# Patient Record
Sex: MALE | Race: WHITE | Employment: FULL TIME | URBAN - METROPOLITAN AREA
[De-identification: names, ages, dates, MRNs, and addresses within clinical notes are randomized per-mention and may not be internally consistent; named-entity substitution may affect disease eponyms.]

---

## 2017-12-06 ENCOUNTER — ANESTHESIA (OUTPATIENT)
Dept: SURGERY | Facility: CLINIC | Age: 52
DRG: 355 | End: 2017-12-06
Payer: COMMERCIAL

## 2017-12-06 ENCOUNTER — ANESTHESIA EVENT (OUTPATIENT)
Dept: SURGERY | Facility: CLINIC | Age: 52
DRG: 355 | End: 2017-12-06
Payer: COMMERCIAL

## 2017-12-06 ENCOUNTER — HOSPITAL ENCOUNTER (INPATIENT)
Facility: CLINIC | Age: 52
LOS: 2 days | Discharge: HOME | DRG: 355 | End: 2017-12-09
Attending: EMERGENCY MEDICINE | Admitting: SURGERY
Payer: COMMERCIAL

## 2017-12-06 ENCOUNTER — APPOINTMENT (OUTPATIENT)
Dept: CT IMAGING | Facility: CLINIC | Age: 52
DRG: 355 | End: 2017-12-06
Attending: EMERGENCY MEDICINE
Payer: COMMERCIAL

## 2017-12-06 DIAGNOSIS — K59.03 THERAPEUTIC OPIOID-INDUCED CONSTIPATION (OIC): Primary | ICD-10-CM

## 2017-12-06 DIAGNOSIS — K56.609 SBO (SMALL BOWEL OBSTRUCTION) (H): ICD-10-CM

## 2017-12-06 DIAGNOSIS — K46.0 INCARCERATED HERNIA: ICD-10-CM

## 2017-12-06 DIAGNOSIS — T40.2X5A THERAPEUTIC OPIOID-INDUCED CONSTIPATION (OIC): Primary | ICD-10-CM

## 2017-12-06 LAB
ALBUMIN SERPL-MCNC: 4 G/DL (ref 3.4–5)
ALP SERPL-CCNC: 70 U/L (ref 40–150)
ALT SERPL W P-5'-P-CCNC: 44 U/L (ref 0–70)
ANION GAP SERPL CALCULATED.3IONS-SCNC: 9 MMOL/L (ref 3–14)
AST SERPL W P-5'-P-CCNC: 32 U/L (ref 0–45)
BASOPHILS # BLD AUTO: 0 10E9/L (ref 0–0.2)
BASOPHILS NFR BLD AUTO: 0.1 %
BILIRUB SERPL-MCNC: 0.5 MG/DL (ref 0.2–1.3)
BUN SERPL-MCNC: 17 MG/DL (ref 7–30)
CALCIUM SERPL-MCNC: 9 MG/DL (ref 8.5–10.1)
CHLORIDE SERPL-SCNC: 105 MMOL/L (ref 94–109)
CO2 SERPL-SCNC: 24 MMOL/L (ref 20–32)
CREAT SERPL-MCNC: 0.81 MG/DL (ref 0.66–1.25)
DIFFERENTIAL METHOD BLD: NORMAL
EOSINOPHIL # BLD AUTO: 0 10E9/L (ref 0–0.7)
EOSINOPHIL NFR BLD AUTO: 0.3 %
ERYTHROCYTE [DISTWIDTH] IN BLOOD BY AUTOMATED COUNT: 13.2 % (ref 10–15)
GFR SERPL CREATININE-BSD FRML MDRD: >90 ML/MIN/1.7M2
GLUCOSE SERPL-MCNC: 125 MG/DL (ref 70–99)
HCT VFR BLD AUTO: 45.4 % (ref 40–53)
HGB BLD-MCNC: 16 G/DL (ref 13.3–17.7)
IMM GRANULOCYTES # BLD: 0 10E9/L (ref 0–0.4)
IMM GRANULOCYTES NFR BLD: 0.3 %
LIPASE SERPL-CCNC: 67 U/L (ref 73–393)
LYMPHOCYTES # BLD AUTO: 0.9 10E9/L (ref 0.8–5.3)
LYMPHOCYTES NFR BLD AUTO: 11.3 %
MCH RBC QN AUTO: 29.8 PG (ref 26.5–33)
MCHC RBC AUTO-ENTMCNC: 35.2 G/DL (ref 31.5–36.5)
MCV RBC AUTO: 85 FL (ref 78–100)
MONOCYTES # BLD AUTO: 0.2 10E9/L (ref 0–1.3)
MONOCYTES NFR BLD AUTO: 2.2 %
NEUTROPHILS # BLD AUTO: 6.7 10E9/L (ref 1.6–8.3)
NEUTROPHILS NFR BLD AUTO: 85.8 %
NRBC # BLD AUTO: 0 10*3/UL
NRBC BLD AUTO-RTO: 0 /100
PLATELET # BLD AUTO: 201 10E9/L (ref 150–450)
POTASSIUM SERPL-SCNC: 3.7 MMOL/L (ref 3.4–5.3)
PROT SERPL-MCNC: 7.6 G/DL (ref 6.8–8.8)
RBC # BLD AUTO: 5.37 10E12/L (ref 4.4–5.9)
SODIUM SERPL-SCNC: 138 MMOL/L (ref 133–144)
WBC # BLD AUTO: 7.8 10E9/L (ref 4–11)

## 2017-12-06 PROCEDURE — 36000056 ZZH SURGERY LEVEL 3 1ST 30 MIN: Performed by: SURGERY

## 2017-12-06 PROCEDURE — 96374 THER/PROPH/DIAG INJ IV PUSH: CPT

## 2017-12-06 PROCEDURE — 27210995 ZZH RX 272: Performed by: SURGERY

## 2017-12-06 PROCEDURE — 99285 EMERGENCY DEPT VISIT HI MDM: CPT | Mod: 25

## 2017-12-06 PROCEDURE — 25000125 ZZHC RX 250: Performed by: NURSE ANESTHETIST, CERTIFIED REGISTERED

## 2017-12-06 PROCEDURE — 37000008 ZZH ANESTHESIA TECHNICAL FEE, 1ST 30 MIN: Performed by: SURGERY

## 2017-12-06 PROCEDURE — 96376 TX/PRO/DX INJ SAME DRUG ADON: CPT

## 2017-12-06 PROCEDURE — 25000125 ZZHC RX 250: Performed by: SURGERY

## 2017-12-06 PROCEDURE — 71000012 ZZH RECOVERY PHASE 1 LEVEL 1 FIRST HR: Performed by: SURGERY

## 2017-12-06 PROCEDURE — 25000128 H RX IP 250 OP 636: Performed by: ANESTHESIOLOGY

## 2017-12-06 PROCEDURE — S0077 INJECTION, CLINDAMYCIN PHOSP: HCPCS | Performed by: SURGERY

## 2017-12-06 PROCEDURE — 80053 COMPREHEN METABOLIC PANEL: CPT | Performed by: EMERGENCY MEDICINE

## 2017-12-06 PROCEDURE — 25000128 H RX IP 250 OP 636: Performed by: EMERGENCY MEDICINE

## 2017-12-06 PROCEDURE — 74177 CT ABD & PELVIS W/CONTRAST: CPT

## 2017-12-06 PROCEDURE — 25000566 ZZH SEVOFLURANE, EA 15 MIN: Performed by: SURGERY

## 2017-12-06 PROCEDURE — 85025 COMPLETE CBC W/AUTO DIFF WBC: CPT | Performed by: EMERGENCY MEDICINE

## 2017-12-06 PROCEDURE — 37000009 ZZH ANESTHESIA TECHNICAL FEE, EACH ADDTL 15 MIN: Performed by: SURGERY

## 2017-12-06 PROCEDURE — 40000169 ZZH STATISTIC PRE-PROCEDURE ASSESSMENT I: Performed by: SURGERY

## 2017-12-06 PROCEDURE — 36000058 ZZH SURGERY LEVEL 3 EA 15 ADDTL MIN: Performed by: SURGERY

## 2017-12-06 PROCEDURE — 25000125 ZZHC RX 250: Performed by: EMERGENCY MEDICINE

## 2017-12-06 PROCEDURE — 49561 ZZHC REPAIR INITIAL INCISIONAL HERNIA; INCARCERATED OR STRANGULATED: CPT | Performed by: SURGERY

## 2017-12-06 PROCEDURE — 25000125 ZZHC RX 250: Performed by: ANESTHESIOLOGY

## 2017-12-06 PROCEDURE — 27210794 ZZH OR GENERAL SUPPLY STERILE: Performed by: SURGERY

## 2017-12-06 PROCEDURE — 83690 ASSAY OF LIPASE: CPT | Performed by: EMERGENCY MEDICINE

## 2017-12-06 PROCEDURE — 96361 HYDRATE IV INFUSION ADD-ON: CPT

## 2017-12-06 PROCEDURE — 99221 1ST HOSP IP/OBS SF/LOW 40: CPT | Mod: 57 | Performed by: SURGERY

## 2017-12-06 PROCEDURE — 49568 ZZHC REPAIR HERNIA WITH MESH: CPT | Performed by: SURGERY

## 2017-12-06 PROCEDURE — 25000128 H RX IP 250 OP 636: Performed by: NURSE ANESTHETIST, CERTIFIED REGISTERED

## 2017-12-06 RX ORDER — LIDOCAINE HYDROCHLORIDE 20 MG/ML
INJECTION, SOLUTION INFILTRATION; PERINEURAL PRN
Status: DISCONTINUED | OUTPATIENT
Start: 2017-12-06 | End: 2017-12-07

## 2017-12-06 RX ORDER — MAGNESIUM HYDROXIDE 1200 MG/15ML
LIQUID ORAL PRN
Status: DISCONTINUED | OUTPATIENT
Start: 2017-12-06 | End: 2017-12-07 | Stop reason: HOSPADM

## 2017-12-06 RX ORDER — FENTANYL CITRATE 50 UG/ML
50-100 INJECTION, SOLUTION INTRAMUSCULAR; INTRAVENOUS
Status: DISCONTINUED | OUTPATIENT
Start: 2017-12-06 | End: 2017-12-07 | Stop reason: HOSPADM

## 2017-12-06 RX ORDER — CLINDAMYCIN PHOSPHATE 900 MG/50ML
900 INJECTION, SOLUTION INTRAVENOUS
Status: DISCONTINUED | OUTPATIENT
Start: 2017-12-06 | End: 2017-12-07 | Stop reason: HOSPADM

## 2017-12-06 RX ORDER — FENTANYL CITRATE 50 UG/ML
25-50 INJECTION, SOLUTION INTRAMUSCULAR; INTRAVENOUS
Status: CANCELLED | OUTPATIENT
Start: 2017-12-06

## 2017-12-06 RX ORDER — GLYCOPYRROLATE 0.2 MG/ML
INJECTION, SOLUTION INTRAMUSCULAR; INTRAVENOUS PRN
Status: DISCONTINUED | OUTPATIENT
Start: 2017-12-06 | End: 2017-12-07

## 2017-12-06 RX ORDER — ONDANSETRON 4 MG/1
4 TABLET, ORALLY DISINTEGRATING ORAL EVERY 30 MIN PRN
Status: CANCELLED | OUTPATIENT
Start: 2017-12-06

## 2017-12-06 RX ORDER — HYDROMORPHONE HYDROCHLORIDE 1 MG/ML
.3-.5 INJECTION, SOLUTION INTRAMUSCULAR; INTRAVENOUS; SUBCUTANEOUS EVERY 5 MIN PRN
Status: CANCELLED | OUTPATIENT
Start: 2017-12-06

## 2017-12-06 RX ORDER — ONDANSETRON 2 MG/ML
INJECTION INTRAMUSCULAR; INTRAVENOUS PRN
Status: DISCONTINUED | OUTPATIENT
Start: 2017-12-06 | End: 2017-12-07

## 2017-12-06 RX ORDER — NALOXONE HYDROCHLORIDE 0.4 MG/ML
.1-.4 INJECTION, SOLUTION INTRAMUSCULAR; INTRAVENOUS; SUBCUTANEOUS
Status: CANCELLED | OUTPATIENT
Start: 2017-12-06 | End: 2017-12-07

## 2017-12-06 RX ORDER — SODIUM CHLORIDE, SODIUM LACTATE, POTASSIUM CHLORIDE, CALCIUM CHLORIDE 600; 310; 30; 20 MG/100ML; MG/100ML; MG/100ML; MG/100ML
INJECTION, SOLUTION INTRAVENOUS CONTINUOUS
Status: CANCELLED | OUTPATIENT
Start: 2017-12-06

## 2017-12-06 RX ORDER — IOPAMIDOL 755 MG/ML
113 INJECTION, SOLUTION INTRAVASCULAR ONCE
Status: COMPLETED | OUTPATIENT
Start: 2017-12-06 | End: 2017-12-06

## 2017-12-06 RX ORDER — HYDROMORPHONE HYDROCHLORIDE 1 MG/ML
0.5 INJECTION, SOLUTION INTRAMUSCULAR; INTRAVENOUS; SUBCUTANEOUS
Status: DISCONTINUED | OUTPATIENT
Start: 2017-12-06 | End: 2017-12-07

## 2017-12-06 RX ORDER — SODIUM CHLORIDE, SODIUM LACTATE, POTASSIUM CHLORIDE, CALCIUM CHLORIDE 600; 310; 30; 20 MG/100ML; MG/100ML; MG/100ML; MG/100ML
INJECTION, SOLUTION INTRAVENOUS CONTINUOUS
Status: DISCONTINUED | OUTPATIENT
Start: 2017-12-06 | End: 2017-12-07 | Stop reason: HOSPADM

## 2017-12-06 RX ORDER — NEOSTIGMINE METHYLSULFATE 1 MG/ML
VIAL (ML) INJECTION PRN
Status: DISCONTINUED | OUTPATIENT
Start: 2017-12-06 | End: 2017-12-07

## 2017-12-06 RX ORDER — LIDOCAINE 40 MG/G
CREAM TOPICAL
Status: DISCONTINUED | OUTPATIENT
Start: 2017-12-06 | End: 2017-12-07 | Stop reason: HOSPADM

## 2017-12-06 RX ORDER — SODIUM CHLORIDE, SODIUM LACTATE, POTASSIUM CHLORIDE, CALCIUM CHLORIDE 600; 310; 30; 20 MG/100ML; MG/100ML; MG/100ML; MG/100ML
INJECTION, SOLUTION INTRAVENOUS CONTINUOUS PRN
Status: DISCONTINUED | OUTPATIENT
Start: 2017-12-06 | End: 2017-12-07

## 2017-12-06 RX ORDER — ONDANSETRON 2 MG/ML
4 INJECTION INTRAMUSCULAR; INTRAVENOUS EVERY 30 MIN PRN
Status: CANCELLED | OUTPATIENT
Start: 2017-12-06

## 2017-12-06 RX ORDER — PROPOFOL 10 MG/ML
INJECTION, EMULSION INTRAVENOUS PRN
Status: DISCONTINUED | OUTPATIENT
Start: 2017-12-06 | End: 2017-12-07

## 2017-12-06 RX ORDER — CLINDAMYCIN PHOSPHATE 900 MG/50ML
900 INJECTION, SOLUTION INTRAVENOUS SEE ADMIN INSTRUCTIONS
Status: DISCONTINUED | OUTPATIENT
Start: 2017-12-06 | End: 2017-12-07 | Stop reason: HOSPADM

## 2017-12-06 RX ORDER — DEXAMETHASONE SODIUM PHOSPHATE 4 MG/ML
INJECTION, SOLUTION INTRA-ARTICULAR; INTRALESIONAL; INTRAMUSCULAR; INTRAVENOUS; SOFT TISSUE PRN
Status: DISCONTINUED | OUTPATIENT
Start: 2017-12-06 | End: 2017-12-07

## 2017-12-06 RX ORDER — FENTANYL CITRATE 50 UG/ML
INJECTION, SOLUTION INTRAMUSCULAR; INTRAVENOUS PRN
Status: DISCONTINUED | OUTPATIENT
Start: 2017-12-06 | End: 2017-12-07

## 2017-12-06 RX ADMIN — SODIUM CHLORIDE, POTASSIUM CHLORIDE, SODIUM LACTATE AND CALCIUM CHLORIDE 1000 ML: 600; 310; 30; 20 INJECTION, SOLUTION INTRAVENOUS at 19:34

## 2017-12-06 RX ADMIN — NEOSTIGMINE METHYLSULFATE 5 MG: 1 INJECTION INTRAMUSCULAR; INTRAVENOUS; SUBCUTANEOUS at 23:58

## 2017-12-06 RX ADMIN — ONDANSETRON 4 MG: 2 INJECTION INTRAMUSCULAR; INTRAVENOUS at 22:54

## 2017-12-06 RX ADMIN — FENTANYL CITRATE 50 MCG: 50 INJECTION, SOLUTION INTRAMUSCULAR; INTRAVENOUS at 23:15

## 2017-12-06 RX ADMIN — PHENYLEPHRINE HYDROCHLORIDE 100 MCG: 10 INJECTION INTRAVENOUS at 22:48

## 2017-12-06 RX ADMIN — SODIUM CHLORIDE 74 ML: 9 INJECTION, SOLUTION INTRAVENOUS at 20:31

## 2017-12-06 RX ADMIN — FENTANYL CITRATE 50 MCG: 50 INJECTION, SOLUTION INTRAMUSCULAR; INTRAVENOUS at 23:25

## 2017-12-06 RX ADMIN — ROCURONIUM BROMIDE 50 MG: 10 INJECTION INTRAVENOUS at 22:47

## 2017-12-06 RX ADMIN — PROPOFOL 200 MG: 10 INJECTION, EMULSION INTRAVENOUS at 22:40

## 2017-12-06 RX ADMIN — SODIUM CHLORIDE, POTASSIUM CHLORIDE, SODIUM LACTATE AND CALCIUM CHLORIDE: 600; 310; 30; 20 INJECTION, SOLUTION INTRAVENOUS at 22:50

## 2017-12-06 RX ADMIN — FENTANYL CITRATE 50 MCG: 50 INJECTION, SOLUTION INTRAMUSCULAR; INTRAVENOUS at 23:54

## 2017-12-06 RX ADMIN — SUCCINYLCHOLINE CHLORIDE 100 MG: 20 INJECTION, SOLUTION INTRAMUSCULAR; INTRAVENOUS at 22:40

## 2017-12-06 RX ADMIN — GLYCOPYRROLATE 0.8 MG: 0.2 INJECTION, SOLUTION INTRAMUSCULAR; INTRAVENOUS at 23:58

## 2017-12-06 RX ADMIN — FENTANYL CITRATE 100 MCG: 50 INJECTION, SOLUTION INTRAMUSCULAR; INTRAVENOUS at 22:40

## 2017-12-06 RX ADMIN — PHENYLEPHRINE HYDROCHLORIDE 100 MCG: 10 INJECTION INTRAVENOUS at 22:54

## 2017-12-06 RX ADMIN — PHENYLEPHRINE HYDROCHLORIDE 100 MCG: 10 INJECTION INTRAVENOUS at 22:57

## 2017-12-06 RX ADMIN — PHENYLEPHRINE HYDROCHLORIDE 100 MCG: 10 INJECTION INTRAVENOUS at 23:10

## 2017-12-06 RX ADMIN — LIDOCAINE HYDROCHLORIDE 0.3 ML: 10 INJECTION, SOLUTION EPIDURAL; INFILTRATION; INTRACAUDAL; PERINEURAL at 22:27

## 2017-12-06 RX ADMIN — LIDOCAINE HYDROCHLORIDE 100 MG: 20 INJECTION, SOLUTION INFILTRATION; PERINEURAL at 22:40

## 2017-12-06 RX ADMIN — IOPAMIDOL 113 ML: 755 INJECTION, SOLUTION INTRAVENOUS at 20:31

## 2017-12-06 RX ADMIN — SODIUM CHLORIDE, POTASSIUM CHLORIDE, SODIUM LACTATE AND CALCIUM CHLORIDE: 600; 310; 30; 20 INJECTION, SOLUTION INTRAVENOUS at 22:26

## 2017-12-06 RX ADMIN — HYDROMORPHONE HYDROCHLORIDE 1 MG: 1 INJECTION, SOLUTION INTRAMUSCULAR; INTRAVENOUS; SUBCUTANEOUS at 19:35

## 2017-12-06 RX ADMIN — CLINDAMYCIN PHOSPHATE 900 MG: 18 INJECTION, SOLUTION INTRAVENOUS at 22:45

## 2017-12-06 RX ADMIN — MIDAZOLAM HYDROCHLORIDE 2 MG: 1 INJECTION, SOLUTION INTRAMUSCULAR; INTRAVENOUS at 22:37

## 2017-12-06 RX ADMIN — DEXAMETHASONE SODIUM PHOSPHATE 4 MG: 4 INJECTION, SOLUTION INTRA-ARTICULAR; INTRALESIONAL; INTRAMUSCULAR; INTRAVENOUS; SOFT TISSUE at 22:54

## 2017-12-06 RX ADMIN — Medication 0.5 MG: at 20:24

## 2017-12-06 RX ADMIN — PHENYLEPHRINE HYDROCHLORIDE 150 MCG: 10 INJECTION INTRAVENOUS at 23:05

## 2017-12-06 ASSESSMENT — ENCOUNTER SYMPTOMS
VOMITING: 1
DIAPHORESIS: 1
DIARRHEA: 0
FEVER: 1
CHILLS: 1
ABDOMINAL PAIN: 1

## 2017-12-06 ASSESSMENT — LIFESTYLE VARIABLES: TOBACCO_USE: 0

## 2017-12-06 ASSESSMENT — COPD QUESTIONNAIRES: COPD: 0

## 2017-12-06 NOTE — IP AVS SNAPSHOT
[facility] [address] [facility]    [address] [place] [address]    Phone:  [phone]                                       After Visit Summary   12/6/2017    Christiano Thorne    MRN: [id number]           After Visit Summary Signature Page     I have received my discharge instructions, and my questions have been answered. I have discussed any challenges I see with this plan with the nurse or doctor.    ..........................................................................................................................................  Patient/Patient Representative Signature      ..........................................................................................................................................  Patient Representative Print Name and Relationship to Patient    ..................................................               ................................................  Date                                            Time    ..........................................................................................................................................  Reviewed by Signature/Title    ...................................................              ..............................................  Date                                                            Time

## 2017-12-06 NOTE — LETTER
[facility]  [address] [place] [address]  [phone]          December 7, 2017    RE:  Christiano Thorne                                                                                                                                                       [address] [place] [id number]            To whom it may concern:    Christiano Thorne is under my professional care. He had surgery on 12/6/2017 and is expected to be out of work for 1-2 weeks.       Sincerely,        Ruth Kong MD

## 2017-12-06 NOTE — IP AVS SNAPSHOT
MRN:[id number]                      After Visit Summary   12/6/2017    Christiano Thorne    MRN: [id number]           Thank you!     Thank you for choosing [facility] for your care. Our goal is always to provide you with excellent care. Hearing back from our patients is one way we can continue to improve our services. Please take a few minutes to complete the written survey that you may receive in the mail after you visit with us. Thank you!        Patient Information     Date Of Birth          1965        Designated Caregiver       Most Recent Value    Caregiver    Will someone help with your care after discharge? yes    Name of designated caregiver Alicia    Phone number of caregiver [phone]    Caregiver address [address] [place] [address]      About your hospital stay     You were admitted on:  December 7, 2017 You last received care in the:  [place] [facility] [address] [facility]    You were discharged on:  December 9, 2017        Reason for your hospital stay       Incarcerated ventral hernia s/p exploratory laparotomy with lysis of adhesions and repair of ventral hernia                  Who to Call     For medical emergencies, please call 911.  For non-urgent questions about your medical care, please call your primary care provider or clinic, [phone]  For questions related to your surgery, please call your surgery clinic        Attending Provider     Provider Specialty    Leyda Martínez MD Emergency Medicine    Whitney Perez MD Surgery       Primary Care Provider Office Phone # Fax #    [facility] [phone]      After Care Instructions     Activity       Your activity upon discharge: activity as tolerated. No lifting >15lbs for 6 weeks. Walking/stairs are fine. No submerging incision for 2 weeks. Ok to shower 48hrs post operatively.            Diet       Follow this diet upon discharge: Regular            Wound care and  dressings       Instructions to care for your wound at home: ice to area for comfort and keep wound clean and dry.                  Follow-up Appointments     Follow-up and recommended labs and tests        Follow up with me,  Whitney Perez, within 1-2  weeks from discharge. to evaluate after surgery.  No follow up labs or test are needed.staples will be removed at this time. Please call [phone] to schedule this appt.  May take up to 4000mg of tylenol per 24 hours concurrently with oxycodone if needed for pain.                  Further instructions from your care team       Discharge Instructions following General Abdominal Surgery  [facility]    Diet:    Diet as instructed by surgeon.  Avoid gas forming foods.  You may find your appetite to be diminished briefly after surgery.  Drink plenty of fluids, especially water.  Activities:    Activity as tolerated and instructed by surgeon.  Take frequent, short walks.  No strenuous activity or heavy lifting (greater than 10-15 lbs) until approved by surgeon.  Bathing/Incision Care    Ok to shower.  Do not submerge incision (no tub baths or swimming) until advised by surgeon.  Pat incisions dry.  If present, staples will be removed in the office.  If present, tape dressing (Steri-Strips) will fall off on their own in 7-10 days.  No lotion, powders, or perfumes near or on the incision.  What to expect:    A tingly or itchy sensation around the incision is a sign of normal healing.    A small amount of clear, pink drainage from the incision is normal.  Call your surgeon if you have these signs or symptoms:    Fever greater than 101 oF or chills.    Redness, swelling, warmth, foul-smelling drainage from incision.    Increased pain that does not improve with pain medicine.    With any questions or concerns.        Pending Results     No orders found from 12/4/2017 to 12/7/2017.            Statement of Approval     Ordered           "17 0953  I have reviewed and agree with all the recommendations and orders detailed in this document.  EFFECTIVE NOW     Approved and electronically signed by:  Everett Manzano PA-C             Admission Information     Date & Time Provider Department Dept. Phone    2017 Whitney Perez MD [place] [address] [facility] [phone]      Your Vitals Were     Blood Pressure Pulse Temperature Respirations Height Weight    139/84 (BP Location: Right arm) 91 98.4  F (36.9  C) (Oral) 16 1.753 m (5' 9\") 102.1 kg (225 lb)    Pulse Oximetry BMI (Body Mass Index)                95% 33.23 kg/m2          MyChart Information     [employer] lets you send messages to your doctor, view your test results, renew your prescriptions, schedule appointments and more. To sign up, go to [email or web address] . Click on \"Log in\" on the left side of the screen, which will take you to the Welcome page. Then click on \"Sign up Now\" on the right side of the page.     You will be asked to enter the access code listed below, as well as some personal information. Please follow the directions to create your username and password.     Your access code is: [email or web address]  Expires: 3/9/2018 10:34 AM     Your access code will  in 90 days. If you need help or a new code, please call your [facility] clinic or [phone].        Care EveryWhere ID     This is your Care EveryWhere ID. This could be used by other organizations to access your [facility] medical records  [id number]        Equal Access to Services     [facility]: Hadii hang Ramirez, waaxda luqadaha, qaybta kaaleric smith . So [facility] [phone].    ATENCIÓN: Si habla español, tiene a chavez disposición servicios gratuitos de asistencia lingüística. Llame al [phone].    We comply with applicable federal civil rights laws and [place] laws. We do not discriminate on the basis of race, color, national " origin, age, disability, sex, sexual orientation, or gender identity.               Review of your medicines      START taking        Dose / Directions    oxyCODONE IR 5 MG tablet   Commonly known as:  ROXICODONE        Dose:  5-10 mg   Take 1-2 tablets (5-10 mg) by mouth every 3 hours as needed for moderate to severe pain   Quantity:  30 tablet   Refills:  0       senna-docusate 8.6-50 MG per tablet   Commonly known as:  SENOKOT-S;PERICOLACE   Used for:  Therapeutic opioid-induced constipation (OIC)        Dose:  2 tablet   Take 2 tablets by mouth 2 times daily as needed for constipation   Quantity:  30 tablet   Refills:  0         CONTINUE these medicines which have NOT CHANGED        Dose / Directions    VITAMIN C PO        Dose:  500 mg   Take 500 mg by mouth daily   Refills:  0       ZOLOFT PO        Dose:  100 mg   Take 100 mg by mouth daily   Refills:  0            Where to get your medicines      These medications were sent to [facility] [place], [place] - [address]  [address], [place] [address]     Phone:  [phone]     senna-docusate 8.6-50 MG per tablet         Some of these will need a paper prescription and others can be bought over the counter. Ask your nurse if you have questions.     Bring a paper prescription for each of these medications     oxyCODONE IR 5 MG tablet                Protect others around you: Learn how to safely use, store and throw away your medicines at www.disposemymeds.org.             Medication List: This is a list of all your medications and when to take them. Check marks below indicate your daily home schedule. Keep this list as a reference.      Medications           Morning Afternoon Evening Bedtime As Needed    oxyCODONE IR 5 MG tablet   Commonly known as:  ROXICODONE   Take 1-2 tablets (5-10 mg) by mouth every 3 hours as needed for moderate to severe pain   Last time this was given:  10 mg on 12/9/2017 10:30 AM   Next Dose Due:  May  take again anytime after 1:45 pm today for pain                                senna-docusate 8.6-50 MG per tablet   Commonly known as:  SENOKOT-S;PERICOLACE   Take 2 tablets by mouth 2 times daily as needed for constipation   Last time this was given:  2 tablets on 12/9/2017 10:30 AM   Next Dose Due:  May take again this evening if needed.                                       VITAMIN C PO   Take 500 mg by mouth daily   Next Dose Due:  Resume tomorrow per home routine.                                    ZOLOFT PO   Take 100 mg by mouth daily   Last time this was given:  100 mg on 12/9/2017 10:29 AM   Next Dose Due:  Resume tomorrow per home routine.

## 2017-12-07 PROBLEM — K46.0 INCARCERATED HERNIA: Status: ACTIVE | Noted: 2017-12-07

## 2017-12-07 LAB
ANION GAP SERPL CALCULATED.3IONS-SCNC: 7 MMOL/L (ref 3–14)
BUN SERPL-MCNC: 13 MG/DL (ref 7–30)
CALCIUM SERPL-MCNC: 8.4 MG/DL (ref 8.5–10.1)
CHLORIDE SERPL-SCNC: 105 MMOL/L (ref 94–109)
CO2 SERPL-SCNC: 25 MMOL/L (ref 20–32)
CREAT SERPL-MCNC: 0.64 MG/DL (ref 0.66–1.25)
ERYTHROCYTE [DISTWIDTH] IN BLOOD BY AUTOMATED COUNT: 13.5 % (ref 10–15)
GFR SERPL CREATININE-BSD FRML MDRD: >90 ML/MIN/1.7M2
GLUCOSE SERPL-MCNC: 125 MG/DL (ref 70–99)
HCT VFR BLD AUTO: 42.1 % (ref 40–53)
HGB BLD-MCNC: 14.2 G/DL (ref 13.3–17.7)
MCH RBC QN AUTO: 29.2 PG (ref 26.5–33)
MCHC RBC AUTO-ENTMCNC: 33.7 G/DL (ref 31.5–36.5)
MCV RBC AUTO: 86 FL (ref 78–100)
PLATELET # BLD AUTO: 170 10E9/L (ref 150–450)
POTASSIUM SERPL-SCNC: 4.1 MMOL/L (ref 3.4–5.3)
RBC # BLD AUTO: 4.87 10E12/L (ref 4.4–5.9)
SODIUM SERPL-SCNC: 137 MMOL/L (ref 133–144)
WBC # BLD AUTO: 11.1 10E9/L (ref 4–11)

## 2017-12-07 PROCEDURE — S5010 5% DEXTROSE AND 0.45% SALINE: HCPCS | Performed by: SURGERY

## 2017-12-07 PROCEDURE — 25000128 H RX IP 250 OP 636: Performed by: ANESTHESIOLOGY

## 2017-12-07 PROCEDURE — 25000128 H RX IP 250 OP 636: Performed by: SURGERY

## 2017-12-07 PROCEDURE — 80048 BASIC METABOLIC PNL TOTAL CA: CPT | Performed by: SURGERY

## 2017-12-07 PROCEDURE — 25800025 ZZH RX 258: Performed by: SURGERY

## 2017-12-07 PROCEDURE — 12000007 ZZH R&B INTERMEDIATE

## 2017-12-07 PROCEDURE — 25000128 H RX IP 250 OP 636: Performed by: NURSE ANESTHETIST, CERTIFIED REGISTERED

## 2017-12-07 PROCEDURE — S0020 INJECTION, BUPIVICAINE HYDRO: HCPCS | Performed by: SURGERY

## 2017-12-07 PROCEDURE — 85027 COMPLETE CBC AUTOMATED: CPT | Performed by: SURGERY

## 2017-12-07 PROCEDURE — 25000125 ZZHC RX 250: Performed by: SURGERY

## 2017-12-07 PROCEDURE — 25000132 ZZH RX MED GY IP 250 OP 250 PS 637: Performed by: SURGERY

## 2017-12-07 PROCEDURE — 0WQF0ZZ REPAIR ABDOMINAL WALL, OPEN APPROACH: ICD-10-PCS | Performed by: SURGERY

## 2017-12-07 PROCEDURE — 36415 COLL VENOUS BLD VENIPUNCTURE: CPT | Performed by: SURGERY

## 2017-12-07 RX ORDER — HYDROMORPHONE HYDROCHLORIDE 1 MG/ML
.3-.5 INJECTION, SOLUTION INTRAMUSCULAR; INTRAVENOUS; SUBCUTANEOUS
Status: DISCONTINUED | OUTPATIENT
Start: 2017-12-07 | End: 2017-12-09 | Stop reason: HOSPADM

## 2017-12-07 RX ORDER — OXYCODONE HYDROCHLORIDE 5 MG/1
5-10 TABLET ORAL
Status: DISCONTINUED | OUTPATIENT
Start: 2017-12-07 | End: 2017-12-09 | Stop reason: HOSPADM

## 2017-12-07 RX ORDER — ACETAMINOPHEN 325 MG/1
650 TABLET ORAL EVERY 4 HOURS PRN
Status: DISCONTINUED | OUTPATIENT
Start: 2017-12-07 | End: 2017-12-08

## 2017-12-07 RX ORDER — ONDANSETRON 4 MG/1
4 TABLET, ORALLY DISINTEGRATING ORAL EVERY 6 HOURS PRN
Status: DISCONTINUED | OUTPATIENT
Start: 2017-12-07 | End: 2017-12-09 | Stop reason: HOSPADM

## 2017-12-07 RX ORDER — SODIUM CHLORIDE, SODIUM LACTATE, POTASSIUM CHLORIDE, CALCIUM CHLORIDE 600; 310; 30; 20 MG/100ML; MG/100ML; MG/100ML; MG/100ML
INJECTION, SOLUTION INTRAVENOUS CONTINUOUS
Status: DISCONTINUED | OUTPATIENT
Start: 2017-12-07 | End: 2017-12-09

## 2017-12-07 RX ORDER — BUPIVACAINE HYDROCHLORIDE 5 MG/ML
INJECTION, SOLUTION PERINEURAL PRN
Status: DISCONTINUED | OUTPATIENT
Start: 2017-12-07 | End: 2017-12-07 | Stop reason: HOSPADM

## 2017-12-07 RX ORDER — SERTRALINE HYDROCHLORIDE 100 MG/1
100 TABLET, FILM COATED ORAL DAILY
Status: DISCONTINUED | OUTPATIENT
Start: 2017-12-07 | End: 2017-12-09 | Stop reason: HOSPADM

## 2017-12-07 RX ORDER — ONDANSETRON 2 MG/ML
4 INJECTION INTRAMUSCULAR; INTRAVENOUS EVERY 6 HOURS PRN
Status: DISCONTINUED | OUTPATIENT
Start: 2017-12-07 | End: 2017-12-09 | Stop reason: HOSPADM

## 2017-12-07 RX ORDER — LIDOCAINE 40 MG/G
CREAM TOPICAL
Status: DISCONTINUED | OUTPATIENT
Start: 2017-12-07 | End: 2017-12-09 | Stop reason: HOSPADM

## 2017-12-07 RX ORDER — NALOXONE HYDROCHLORIDE 0.4 MG/ML
.1-.4 INJECTION, SOLUTION INTRAMUSCULAR; INTRAVENOUS; SUBCUTANEOUS
Status: DISCONTINUED | OUTPATIENT
Start: 2017-12-07 | End: 2017-12-09 | Stop reason: HOSPADM

## 2017-12-07 RX ORDER — PROCHLORPERAZINE MALEATE 10 MG
10 TABLET ORAL EVERY 6 HOURS PRN
Status: DISCONTINUED | OUTPATIENT
Start: 2017-12-07 | End: 2017-12-09 | Stop reason: HOSPADM

## 2017-12-07 RX ADMIN — PHENYLEPHRINE HYDROCHLORIDE 150 MCG: 10 INJECTION INTRAVENOUS at 00:10

## 2017-12-07 RX ADMIN — ENOXAPARIN SODIUM 40 MG: 40 INJECTION SUBCUTANEOUS at 18:36

## 2017-12-07 RX ADMIN — PROPOFOL 50 MG: 10 INJECTION, EMULSION INTRAVENOUS at 00:02

## 2017-12-07 RX ADMIN — OXYCODONE HYDROCHLORIDE 10 MG: 5 TABLET ORAL at 15:52

## 2017-12-07 RX ADMIN — SODIUM CHLORIDE, POTASSIUM CHLORIDE, SODIUM LACTATE AND CALCIUM CHLORIDE: 600; 310; 30; 20 INJECTION, SOLUTION INTRAVENOUS at 00:13

## 2017-12-07 RX ADMIN — OXYCODONE HYDROCHLORIDE 10 MG: 5 TABLET ORAL at 12:02

## 2017-12-07 RX ADMIN — OXYCODONE HYDROCHLORIDE 10 MG: 5 TABLET ORAL at 22:24

## 2017-12-07 RX ADMIN — Medication 2 LOZENGE: at 02:32

## 2017-12-07 RX ADMIN — OXYCODONE HYDROCHLORIDE 10 MG: 5 TABLET ORAL at 18:53

## 2017-12-07 RX ADMIN — HYDROMORPHONE HYDROCHLORIDE 0.5 MG: 1 INJECTION, SOLUTION INTRAMUSCULAR; INTRAVENOUS; SUBCUTANEOUS at 01:57

## 2017-12-07 RX ADMIN — PROPOFOL 50 MG: 10 INJECTION, EMULSION INTRAVENOUS at 00:10

## 2017-12-07 RX ADMIN — HYDROMORPHONE HYDROCHLORIDE 0.5 MG: 1 INJECTION, SOLUTION INTRAMUSCULAR; INTRAVENOUS; SUBCUTANEOUS at 00:05

## 2017-12-07 RX ADMIN — SODIUM CHLORIDE, POTASSIUM CHLORIDE, SODIUM LACTATE AND CALCIUM CHLORIDE 500 ML: 600; 310; 30; 20 INJECTION, SOLUTION INTRAVENOUS at 06:35

## 2017-12-07 RX ADMIN — HYDROMORPHONE HYDROCHLORIDE 0.5 MG: 1 INJECTION, SOLUTION INTRAMUSCULAR; INTRAVENOUS; SUBCUTANEOUS at 01:04

## 2017-12-07 RX ADMIN — SODIUM CHLORIDE, POTASSIUM CHLORIDE, SODIUM LACTATE AND CALCIUM CHLORIDE: 600; 310; 30; 20 INJECTION, SOLUTION INTRAVENOUS at 08:51

## 2017-12-07 RX ADMIN — DEXTROSE AND SODIUM CHLORIDE: 5; 450 INJECTION, SOLUTION INTRAVENOUS at 01:58

## 2017-12-07 RX ADMIN — SODIUM CHLORIDE, POTASSIUM CHLORIDE, SODIUM LACTATE AND CALCIUM CHLORIDE: 600; 310; 30; 20 INJECTION, SOLUTION INTRAVENOUS at 12:32

## 2017-12-07 RX ADMIN — HYDROMORPHONE HYDROCHLORIDE 0.5 MG: 1 INJECTION, SOLUTION INTRAMUSCULAR; INTRAVENOUS; SUBCUTANEOUS at 08:59

## 2017-12-07 RX ADMIN — HYDROMORPHONE HYDROCHLORIDE 0.5 MG: 1 INJECTION, SOLUTION INTRAMUSCULAR; INTRAVENOUS; SUBCUTANEOUS at 05:06

## 2017-12-07 RX ADMIN — SODIUM CHLORIDE, POTASSIUM CHLORIDE, SODIUM LACTATE AND CALCIUM CHLORIDE: 600; 310; 30; 20 INJECTION, SOLUTION INTRAVENOUS at 21:00

## 2017-12-07 RX ADMIN — SERTRALINE HYDROCHLORIDE 100 MG: 100 TABLET ORAL at 08:49

## 2017-12-07 NOTE — H&P
"[facility] General Surgery Consultation    Christiano Thorne MRN# [id number]   YOB: 1965 Age: 52 year old      Date of Admission:  12/6/2017  Date of Consult: 12/6/2017         Assessment and Plan:   Patient is a 52 year old male with an incarcerated ventral hernia with concern for bowel ischemia and bowel obstruction.     PLAN:  Emergent laparotomy with possible bowel resection, lysis of adhesions, ventral hernia repair  Admit post operatively  NPO, IVF  Perioperative abx         Requesting Physician:      Dr. Tello        Chief Complaint:     Chief Complaint   Patient presents with     Abdominal Pain     started at noon today, emesis x1, states it's sharp. Appendix removed already          History of Present Illness:   Christiano Thorne is a 52 year old male who was seen in consultation at the request of Dr. Tello who presented with abdominal pain which began earlier today. He reports persistent nausea. He had one episode of self induced emesis. His last bowel movement was yesterday. He is not passing gas. He has a history of open appendectomy. He reports this pain is similar to that. He tried ibuprofen without improvement. Movement makes the pain worse. He is tachycardic. He had a CT which reveals an incarcerated ventral hernia with fat stranding surrounding the bowel concerning for possible ischemia.            Physical Exam:   Blood pressure 122/78, pulse 105, temperature 99.6  F (37.6  C), temperature source Oral, resp. rate 16, height 1.753 m (5' 9\"), weight 102.1 kg (225 lb), SpO2 95 %.  225 lbs 0 oz  General: alert, mild distress  Psych: Alert and Oriented.  Normal affect  Neurological: grossly intact  Eyes: Sclera clear  Respiratory:  nonlabored breathing  Cardiovascular:  tachycardic  GI: ventral hernia which is not reducible and is tender to palpation. Local peritonitis   Lymphatic/Hematologic/Immune:  No femoral or cervical lymphadenopathy.  Integumentary:  No rashes         " Past Medical History:   depression         Past Surgical History:   Open appendectomy for ruptured appendicitis         Current Medications:            HYDROmorphone         Home Medications:     Prior to Admission medications    Medication Sig Last Dose Taking? Auth Provider   Sertraline HCl (ZOLOFT PO) Take 100 mg by mouth daily few days ago Yes Reported, Patient   Ascorbic Acid (VITAMIN C PO) Take 500 mg by mouth daily 12/5/2017 at am Yes Unknown, Entered By History            Allergies:     Allergies   Allergen Reactions     Amoxicillin Hives, Swelling and Rash     Occurred in 1988            Family History:   No family history on file.        Social History:   Christiano Thorne  reports that he has never smoked. He does not have any smokeless tobacco history on file. He reports that he drinks alcohol. He reports that he does not use illicit drugs.          Review of Systems:   The 10 point Review of Systems is negative other than noted in the HPI.         Labs/Imaging   All new lab and imaging data was reviewed.   I have personally reviewed the imaging studies including his CT which reveals an incarcerated ventral hernia with loops of small bowel within the hernia and bowel obstruction present.         Whitney Perez MD

## 2017-12-07 NOTE — PLAN OF CARE
Problem: Patient Care Overview  Goal: Plan of Care/Patient Progress Review  Outcome: No Change  Pt to floor at 0145. VSS on RA, BP's soft. Afebrile. A/Ox4. Capno WNL throughout shift. Pain managed with PRN dilaudid. Abdomen incision CDI. Binder in place. Ice applied. Tolerating ice chips, denies nausea. Hypo BS, denies flatus. Denies numbness/tingling. LS clear, IS postop instruction. Denies SOB. IVF. Connelly with adequate output. Needs to dangle. 500 cc LR bolus ordered this AM.

## 2017-12-07 NOTE — ANESTHESIA CARE TRANSFER NOTE
Patient: Christiano Thorne    Procedure(s):  EXPLORATORY LAPAROTOMY, VENTRAL HERNIA REPAIR, LYSIS OF ADHESIONS - Wound Class: I-Clean   - Wound Class: I-Clean   - Wound Class: II-Clean Contaminated    Diagnosis: NA  Diagnosis Additional Information: No value filed.    Anesthesia Type:   General, RSI, ETT     Note:  Airway :Face Mask  Patient transferred to:PACU  Comments: Extubation / Transfer of Care Note:    Christiano Thorne    Spontaneous respirations. Strong and purposeful movement. Suctioned. Extubated awake. O2 via FM.    In PACU. Monitors on. VSS. Report to RN.    12/7/2017    Monik Li CRNA    Handoff Report: Identifed the Patient, Identified the Reponsible Provider, Reviewed the pertinent medical history, Discussed the surgical course, Reviewed Intra-OP anesthesia mangement and issues during anesthesia, Set expectations for post-procedure period and Allowed opportunity for questions and acknowledgement of understanding      Vitals: (Last set prior to Anesthesia Care Transfer)    CRNA VITALS  12/6/2017 2356 - 12/7/2017 0029      12/7/2017             Resp Rate (set): 10                Electronically Signed By: CODY Ridley CRNA  December 7, 2017  12:29 AM

## 2017-12-07 NOTE — PROGRESS NOTES
"Surgery Progress Note    S: Doing okay overnight, some pain, but it has been managed with medications. No flatus/bowel movement.     O:   BP 98/69  Pulse 105  Temp 98.2  F (36.8  C) (Oral)  Resp 12  Ht 1.753 m (5' 9\")  Wt 102.1 kg (225 lb)  SpO2 93%  BMI 33.23 kg/m2  General: In no distress  Resp: Non labored  CV: Mildly tachycardic  Abdomen: Soft, non distended, byron incisionally tender  Extremities: Warm, well perfused    Labs pending    A/P:   53 yo M POD#1 s/p ex lap with ventral hernia repair for incarcerated hernia  Continue PRN pain control, consider PCA if pain not well managed once up and moving  Clear liquid diet, await return of bowel function before advancing  Continue MIVF until adequate PO intake  Remove Connelly this AM  Lovenox for DVT ppx  Anticipate dispo to home in 2-3 days pending return of bowel function, pain control      Ruth Kong, PGY-4  [phone]    "

## 2017-12-07 NOTE — PLAN OF CARE
Problem: Patient Care Overview  Goal: Plan of Care/Patient Progress Review  Outcome: No Change  A&Ox4, VSS. C/O abd pain gave dilaudid and oxycodone, some relief noted. Removed henderson at 0900, Pt is voiding well with last PVR at 108. Ax1. Dressing is CDI, abd binder in place. BS  Have some trinkling noted, - flatus. Continues on clear liquid diet.

## 2017-12-07 NOTE — ED PROVIDER NOTES
History     Chief Complaint:  Abdominal Pain     HPI   Christiano Thorne is a 52 year old male with a history of appendectomy who presents to the emergency department today with his wife for evaluation of abdominal pain. The patient reports that his pain started about seven hours ago in his parumbilical area. He notes that his pain is similar to his appendicitis.     He describes the nature of his pain sharp and constant. He states that he had an episode of emesis about six hours ago, but he mentioned that he forced it to help his pain. He reports that he is the most comfortable at rest and his pain worsens by movement. He notes that he took one 600 mg dose of ibuprofen to help his pain, but it was not helpful. He states that the current bulge in his abdomen is new and has not had it before. The patient reports about a subjective fever along with chills, but denies experiencing dysuria or diarrhea.    Allergies:  Amoxicillin      Medications:    The patient is currently on no regular medications.      Past Medical History:    The patient does not have any past pertinent medical history.     Past Surgical History:    Appendectomy - 2003     Family History:    History reviewed. No pertinent family history.      Social History:  Smoking status: never smoker  Alcohol use: Yes   Marital Status:    Presents with wife      Review of Systems   Constitutional: Positive for chills, diaphoresis and fever.   HENT: Congestion: subjective.    Gastrointestinal: Positive for abdominal pain and vomiting. Negative for diarrhea.   All other systems reviewed and are negative.      Physical Exam   Patient Vitals for the past 24 hrs:   BP Temp Temp src Pulse Heart Rate Resp SpO2 Height Weight   12/07/17 0050 117/73 - - - 90 15 92 % - -   12/07/17 0040 126/88 - - - 93 29 94 % - -   12/07/17 0030 97/77 - - - 105 17 99 % - -   12/07/17 0028 97/77 98.7  F (37.1  C) Temporal - - 16 - - -   12/06/17 2220 125/86 97.9  F (36.6  C) Temporal  "- 79 16 97 % - -   12/06/17 2100 122/78 - - - - - 95 % - -   12/06/17 2015 - - - - - - 93 % - -   12/06/17 2000 (!) 137/95 - - - - - 95 % - -   12/06/17 1948 - - - - - - 96 % - -   12/06/17 1936 (!) 166/106 - - - - - - - -   12/06/17 1912 (!) 140/98 99.6  F (37.6  C) Oral 105 - 16 97 % 1.753 m (5' 9\") 102.1 kg (225 lb)       Physical Exam  General/Appearance: appears stated age, well-groomed, moderately uncomfortable  Eyes: EOMI, no scleral injection, no icterus  ENT: MMM  Neck: supple, nl ROM, no stiffness  Cardiovascular: RRR, nl S1S2, no m/r/g, 2+ pulses in all 4 extremities, cap refill <2sec  Respiratory: CTAB, good air movement throughout, no wheezes/rhonchi/rales, no increased WOB, no retractions  Back: no lesions  GI: abd soft with distended palpable [id number] bulge in mass superior umbilicus with positive tenderness to palpation over mass as well as an epigastrium, non-distended, nttp,  no HSM, no rebound, no guarding, nl BS  MSK: [place], good tone, no bony abnormality  Skin: warm and well-perfused, no rash, no edema, no ecchymosis, nl turgor  Neuro: GCS 15, alert and oriented, no gross focal neuro deficits  Psych: interacts appropriately  Heme: no petechia, no purpura, no active bleeding    Emergency Department Course     Imaging:  Radiology findings were communicated with the patient who voiced understanding of the findings.    CT-scan Abdomen/Pelvis w/ contrast:  1. Incarcerated small bowel loop within a ventral hernia causing  mechanical small bowel obstruction.  2. Recommend surgical consultation.  3. Horseshoe kidney.  Preliminary result per radiology.     Laboratory:  CBC: WNL (WBC 7.8, HGB 16.0, )    CMP: Glucose 125 (H), o/w WNL (Creatinine 0.81)   Lipase: 67 (L)    Interventions:  1935 - Dilaudid 1 mg IV  2250 - LR 1L IV    Emergency Department Course:  Past medical records, nursing notes, and vitals reviewed.  1909: I performed an exam of the patient and obtained history, as documented above.  "   IV inserted and blood drawn.     The patient was sent for a CT-scan while in the emergency department, findings above.     2232: Discussed the patient with Dr. Perez, who will admit the patient to a OR bed for further monitoring, evaluation, and treatment.      Impression & Plan      Medical Decision Making:  Christiano Thorne is a 52 year old male with minimal medical problems except status post appendectomy who presents with periumbilical pain and palpable mass. CT confirms incarcerated hernia, resulting in mechanical small bowel obstruction. There is no evidence of significant decreased blood flow. He will be [facility] to [facility] and admitted to surgery.    Diagnosis:    ICD-10-CM    1. Incarcerated hernia K46.0    2. SBO (small bowel obstruction) K56.609      Scribe Disclosure:  I, Gita Abreu, am serving as a scribe at 7:09 PM on 12/6/2017 to document services personally performed by Leyda Martínez MD based on my observations and the provider's statements to me.       EMERGENCY DEPARTMENT       Leyda Martínez MD  12/09/17 7822

## 2017-12-07 NOTE — ANESTHESIA PREPROCEDURE EVALUATION
Anesthesia Evaluation     . Pt has had prior anesthetic. Type: General    No history of anesthetic complications          ROS/MED HX    ENT/Pulmonary:      (-) tobacco use, asthma, COPD and sleep apnea   Neurologic:       Cardiovascular:        (-) hypertension and CAD   METS/Exercise Tolerance:     Hematologic:         Musculoskeletal:         GI/Hepatic:        (-) GERD and liver disease   Renal/Genitourinary:      (-) renal disease   Endo:      (-) Type I DM and Type II DM   Psychiatric:         Infectious Disease:         Malignancy:         Other:                     Physical Exam  Normal systems: cardiovascular, pulmonary and dental    Airway   Mallampati: III  TM distance: >3 FB  Neck ROM: full    Dental     Cardiovascular       Pulmonary                     Anesthesia Plan      History & Physical Review  History and physical reviewed and following examination; no interval change.    ASA Status:  1 emergent.    NPO Status:  > 8 hours    Plan for General, RSI and ETT with Intravenous induction. Maintenance will be Inhalation.    PONV prophylaxis:  Ondansetron (or other 5HT-3) and Dexamethasone or Solumedrol       Postoperative Care  Postoperative pain management:  IV analgesics.      Consents  Anesthetic plan, risks, benefits and alternatives discussed with:  Patient..                          .

## 2017-12-07 NOTE — ANESTHESIA POSTPROCEDURE EVALUATION
Patient: Christiano Thorne    Procedure(s):  EXPLORATORY LAPAROTOMY, VENTRAL HERNIA REPAIR, LYSIS OF ADHESIONS - Wound Class: I-Clean   - Wound Class: I-Clean   - Wound Class: II-Clean Contaminated    Diagnosis:NA  Diagnosis Additional Information: No value filed.    Anesthesia Type:  General, RSI, ETT    Note:  Anesthesia Post Evaluation    Patient location during evaluation: PACU  Patient participation: Able to fully participate in evaluation  Level of consciousness: awake and alert  Pain management: adequate  Airway patency: patent  Cardiovascular status: acceptable  Respiratory status: acceptable  Hydration status: acceptable  PONV: none     Anesthetic complications: None          Last vitals:  Vitals:    12/07/17 0104 12/07/17 0110 12/07/17 0117   BP:  117/82 117/82   Pulse:      Resp:  17 15   Temp:      SpO2: 93% 90% 92%         Electronically Signed By: Tonio Cruz MD  December 7, 2017  1:39 AM

## 2017-12-07 NOTE — OP NOTE
General Surgery Operative Note    PREOPERATIVE DIAGNOSIS: Incarcerated ventral hernia    POSTOPERATIVE DIAGNOSIS: Incarcerated ventral hernia    PROCEDURE: Exploratory laparotomy, lysis of adhesions, primary repair of ventral hernia    SURGEON:  Whitney Perez MD    ASSISTANT:  Ruth Kong M.D. [facility] resident  The PA s assistance was medically necessary to provide adequate exposure in the operating field, maintain hemostasis, cutting suture, clamping and ligating bleeding vessels, and visualization of anatomic structures throughout the surgical procedure.     ANESTHESIA:  General.    BLOOD LOSS: 35ml    FINDINGS: Incarcerated ventral hernia with omentum and a loop of small bowel incarcerated within hernia sac.  Small bowel viable.  No bowel resection required.  Omentum resected with hernia sac.  Ventral hernia defect 2 cm and midline fascia closed primarily    INDICATIONS:   Mr. Thorne presented with abdominal pain and nausea with an episode of vomiting.  He underwent a CT scan which revealed an incarcerated ventral hernia.  Dr. Tello in the emergency department attempted bedside reduction without success.  Patient was very tender to palpation and on CT scan there was significant surrounding fat stranding.  He had a lengthy discussion with the patient and his wife at bedside regarding the risks, benefits, indications and alternatives to surgery and they elected to proceed with exploratory laparotomy, possible bowel resection, lysis of adhesions, and ventral hernia repair    DETAILS OF PROCEDURE: The patient was brought to the operating room per Anesthesia, placed in supine position, and intubated without difficulty.  Perioperative antibiotics were administered.  A Connelly catheter was placed using sterile technique.  An orogastric tube was placed.A Surgical timeout was then performed, verifying the correct surgeon, site, procedure, and patient and all in the room were in agreement.     We began by making a  vertical midline incision beginning just above the umbilicus and extending to just below overlying the palpable incarcerated hernia.  The subcutaneous tissue was carefully divided with electrocautery.  We encountered the hernia sac to the right of midline and could palpate a very small 2 cm fascial defect just above the umbilicus.  The hernia sac was very large containing omentum and small bowel.  We began by initially dissecting around the hernia sac to attempt to free it.  We then elected to enter the abdomen superior to the fascial defect and hernia sac.  Staying well above the palpable hernia we divided the fascia in the midline.  The abdomen was entered and a a moderate amount of clear fluid was evacuated.  We continued to divide the fascia down to the hernia and this released the bowel intra-abdominally.  The loop of bowel which had been incarcerated appeared erythematous and indurated but viable.  We then entered the hernia sac which was very vascular and there were multiple small blood vessels which were suture ligated.  There was a large amount of omentum which was tethered to the hernia sac and portions of the omentum were ischemic.  We divided the omentum using suture ligature excising the ischemic portions with the hernia sac itself.  The hernia sac was completely excised including a moderate amount of omentum which had been incarcerated within.  We then ran the small bowel from the ligament of Treitz distally to the terminal ileum.  In the mid to distal jejunum there we identified the area of small bowel which was erythematous and mildly thickened.  This area was approximately 6 cm in length and was the previously seen incarcerated section of bowel.  Again the bowel looked very viable.  At this time the abdomen was irrigated with several liters of warm sterile saline.  The greater omentum was placed over the bowel.  The fascia was then grasped using kocher clamps and the fat anterior to the fascia was  cleared back approximately 2 cm circumferentially.  We elected to close the fascia primarily using two looped 0 PDS sutures from the superior aspect and inferior aspect of the incision and tying in the middle.  There was no tension on this closure and the fascia was very healthy and the wound was again irrigated.  The skin was closed with staples.  Sterile dressings were placed.  All needles, sponge, and instrument counts were correct at the conclusion of the procedure ×2.  The patient tolerated the procedure well and was transferred to the PACU in stable condition.  Whitney Perez MD

## 2017-12-07 NOTE — BRIEF OP NOTE
[facility] Brief Operative Note    Pre-operative diagnosis: Incarcerated hernia   Post-operative diagnosis Same   Procedure: Procedure(s):  EXPLORATORY LAPAROTOMY, VENTRAL HERNIA REPAIR - Wound Class: I-Clean   - Wound Class: I-Clean   Surgeon(s): Surgeon(s) and Role:     * Whitney Perez MD - Primary     * Ruth Kong MD - Resident - Assisting   Estimated blood loss: 25 mL    Specimens: * No specimens in log *   Findings: Bowel hyperemic, but viable, no concerns.  Primary closure.

## 2017-12-08 LAB — GLUCOSE BLDC GLUCOMTR-MCNC: 98 MG/DL (ref 70–99)

## 2017-12-08 PROCEDURE — 25000132 ZZH RX MED GY IP 250 OP 250 PS 637: Performed by: SURGERY

## 2017-12-08 PROCEDURE — 25000128 H RX IP 250 OP 636: Performed by: SURGERY

## 2017-12-08 PROCEDURE — 00000146 ZZHCL STATISTIC GLUCOSE BY METER IP

## 2017-12-08 PROCEDURE — 12000007 ZZH R&B INTERMEDIATE

## 2017-12-08 RX ORDER — ACETAMINOPHEN 325 MG/1
650 TABLET ORAL EVERY 6 HOURS
Status: DISCONTINUED | OUTPATIENT
Start: 2017-12-08 | End: 2017-12-09 | Stop reason: HOSPADM

## 2017-12-08 RX ORDER — OXYCODONE HYDROCHLORIDE 5 MG/1
5-10 TABLET ORAL
Qty: 30 TABLET | Refills: 0 | Status: SHIPPED | OUTPATIENT
Start: 2017-12-08 | End: 2017-12-22

## 2017-12-08 RX ADMIN — OXYCODONE HYDROCHLORIDE 10 MG: 5 TABLET ORAL at 17:57

## 2017-12-08 RX ADMIN — SODIUM CHLORIDE, POTASSIUM CHLORIDE, SODIUM LACTATE AND CALCIUM CHLORIDE: 600; 310; 30; 20 INJECTION, SOLUTION INTRAVENOUS at 12:33

## 2017-12-08 RX ADMIN — SODIUM CHLORIDE, POTASSIUM CHLORIDE, SODIUM LACTATE AND CALCIUM CHLORIDE: 600; 310; 30; 20 INJECTION, SOLUTION INTRAVENOUS at 04:36

## 2017-12-08 RX ADMIN — OXYCODONE HYDROCHLORIDE 10 MG: 5 TABLET ORAL at 01:43

## 2017-12-08 RX ADMIN — ENOXAPARIN SODIUM 40 MG: 40 INJECTION SUBCUTANEOUS at 17:47

## 2017-12-08 RX ADMIN — OXYCODONE HYDROCHLORIDE 10 MG: 5 TABLET ORAL at 04:54

## 2017-12-08 RX ADMIN — OXYCODONE HYDROCHLORIDE 10 MG: 5 TABLET ORAL at 08:31

## 2017-12-08 RX ADMIN — OXYCODONE HYDROCHLORIDE 10 MG: 5 TABLET ORAL at 11:44

## 2017-12-08 RX ADMIN — SERTRALINE HYDROCHLORIDE 100 MG: 100 TABLET ORAL at 08:27

## 2017-12-08 RX ADMIN — ACETAMINOPHEN 650 MG: 325 TABLET, FILM COATED ORAL at 17:47

## 2017-12-08 RX ADMIN — ACETAMINOPHEN 650 MG: 325 TABLET, FILM COATED ORAL at 14:00

## 2017-12-08 RX ADMIN — OXYCODONE HYDROCHLORIDE 10 MG: 5 TABLET ORAL at 14:56

## 2017-12-08 RX ADMIN — SODIUM CHLORIDE, POTASSIUM CHLORIDE, SODIUM LACTATE AND CALCIUM CHLORIDE: 600; 310; 30; 20 INJECTION, SOLUTION INTRAVENOUS at 20:05

## 2017-12-08 NOTE — PLAN OF CARE
Problem: Patient Care Overview  Goal: Plan of Care/Patient Progress Review  Outcome: No Change  VSS on RA, temp 99.0 F IS encouraged. Pain managed with PRN oxycodone. Abdominal incision CDI, RLQ tender on palpation, slight bruising, soft. Binder in place. Hypo BS, denies flatus. Denies nausea. Up SBA. Tolerating clears. Voiding.

## 2017-12-08 NOTE — PROGRESS NOTES
"Surgery Progress Note     S: Doing well overall. Having some pain with movement. No N/V, tolerating clears. No flatus. Voiding without difficulty     O:   /71 (BP Location: Left arm)  Pulse 99  Temp 99  F (37.2  C) (Oral)  Resp 14  Ht 1.753 m (5' 9\")  Wt 102.1 kg (225 lb)  SpO2 90%  BMI 33.23 kg/m2  General: In no distress  Resp: Non labored  CV: Mildly tachycardic  Abdomen: Soft, non distended, byron incisionally tender  Extremities: Warm, well perfused        A/P:   51 yo M POD#2 s/p ex lap with ventral hernia repair for incarcerated hernia  Continue PRN pain control, utilize acetaminophen as well as narcotic  Clear liquid diet, await return of bowel function before advancing  Continue MIVF until adequate PO intake  Lovenox for DVT ppx  Anticipate dispo to home in 2-3 days pending return of bowel function        Ruth Kong, PGY-4  [phone]  "

## 2017-12-08 NOTE — PROGRESS NOTES
"General Surgery Progress Note    Subjective: pt passing gas this morning. Reports pain with movement otherwise well controlled.     Objective: /67  Pulse 91  Temp 99  F (37.2  C) (Oral)  Resp 16  Ht 1.753 m (5' 9\")  Wt 102.1 kg (225 lb)  SpO2 92%  BMI 33.23 kg/m2  Gen: alert, no distress.   CV: RRR  Pulm: nonlabored breathing  Abd: mildly distended. Incision w staples in place, ecchymosis to the right of umbilicus   Ext: WWP    Assessment/Plan:   Christiano Thorne  is a 52 year old male POD 2 s/p ex lap for incarcerated ventral hernia. Improving. Pain control could be better.   - schedule tylenol  - prn oxycodone  - advance diet to fulls, if ok - regular for lunch   - likely home tomorrow    Whitney Perez MD  [facility]  [phone]              "

## 2017-12-08 NOTE — PLAN OF CARE
Problem: Patient Care Overview  Goal: Plan of Care/Patient Progress Review  A&O. VSS on RA. Pt c/o of pain in RLQ, bruised but soft to palpation. Pain managed by 10 mg PRN Oxycodone. Abdominal incision CDI/SUELLEN.  Ambulating x1 in the hallways, SBA. Passing flatus, denies nausea, hypoactive BS, no BM. Tolerating diet.     SN Dandre.

## 2017-12-08 NOTE — PLAN OF CARE
Problem: Patient Care Overview  Goal: Plan of Care/Patient Progress Review  Outcome: No Change  Vitals stable, pain controlled with PO oxycodone. Bowel sound hypoactive, tolerated clear liquid diet.. Incision dressing intact, he also wears abdominal binder. Up with SBA ambulated in the driscoll way, and voided per BRP. Continue to monitor.

## 2017-12-09 VITALS
TEMPERATURE: 98.4 F | WEIGHT: 225 LBS | DIASTOLIC BLOOD PRESSURE: 84 MMHG | SYSTOLIC BLOOD PRESSURE: 139 MMHG | HEIGHT: 69 IN | RESPIRATION RATE: 16 BRPM | HEART RATE: 91 BPM | OXYGEN SATURATION: 95 % | BODY MASS INDEX: 33.33 KG/M2

## 2017-12-09 LAB — GLUCOSE BLDC GLUCOMTR-MCNC: 116 MG/DL (ref 70–99)

## 2017-12-09 PROCEDURE — 25000128 H RX IP 250 OP 636: Performed by: SURGERY

## 2017-12-09 PROCEDURE — 00000146 ZZHCL STATISTIC GLUCOSE BY METER IP

## 2017-12-09 PROCEDURE — 25000132 ZZH RX MED GY IP 250 OP 250 PS 637: Performed by: SURGERY

## 2017-12-09 RX ORDER — AMOXICILLIN 250 MG
2 CAPSULE ORAL 2 TIMES DAILY
Status: DISCONTINUED | OUTPATIENT
Start: 2017-12-09 | End: 2017-12-09 | Stop reason: HOSPADM

## 2017-12-09 RX ORDER — BISACODYL 10 MG
10 SUPPOSITORY, RECTAL RECTAL ONCE
Status: COMPLETED | OUTPATIENT
Start: 2017-12-09 | End: 2017-12-09

## 2017-12-09 RX ORDER — AMOXICILLIN 250 MG
2 CAPSULE ORAL 2 TIMES DAILY PRN
Qty: 30 TABLET | Refills: 0 | Status: SHIPPED | OUTPATIENT
Start: 2017-12-09 | End: 2017-12-22

## 2017-12-09 RX ADMIN — OXYCODONE HYDROCHLORIDE 10 MG: 5 TABLET ORAL at 10:30

## 2017-12-09 RX ADMIN — BISACODYL 10 MG: 10 SUPPOSITORY RECTAL at 05:53

## 2017-12-09 RX ADMIN — OXYCODONE HYDROCHLORIDE 10 MG: 5 TABLET ORAL at 01:17

## 2017-12-09 RX ADMIN — ACETAMINOPHEN 650 MG: 325 TABLET, FILM COATED ORAL at 05:53

## 2017-12-09 RX ADMIN — SODIUM CHLORIDE, POTASSIUM CHLORIDE, SODIUM LACTATE AND CALCIUM CHLORIDE: 600; 310; 30; 20 INJECTION, SOLUTION INTRAVENOUS at 04:05

## 2017-12-09 RX ADMIN — SENNOSIDES AND DOCUSATE SODIUM 2 TABLET: 8.6; 5 TABLET ORAL at 10:30

## 2017-12-09 RX ADMIN — ACETAMINOPHEN 650 MG: 325 TABLET, FILM COATED ORAL at 01:17

## 2017-12-09 RX ADMIN — SERTRALINE HYDROCHLORIDE 100 MG: 100 TABLET ORAL at 10:29

## 2017-12-09 NOTE — PLAN OF CARE
Problem: Patient Care Overview  Goal: Plan of Care/Patient Progress Review  Outcome: Improving  AVSS.  Pain 2/10.  Oxycodone PRN.  Declined when offered.  Passing gas.  IS to 1500.  Incision with staples and dressing intact. Right lower abdominal area tender. Binder on.  Voiding

## 2017-12-09 NOTE — PLAN OF CARE
Problem: Patient Care Overview  Goal: Plan of Care/Patient Progress Review  Outcome: Improving  A+Ox4. VSS on RA. Abd incision CDI. RLQ tender and soft. Binder in place. +BS. +Gas. Pain managed with oxycodone and scheduled tylenol. Voiding. SBA. Tolerating full liquid diet.

## 2017-12-09 NOTE — PLAN OF CARE
Problem: Patient Care Overview  Goal: Plan of Care/Patient Progress Review  Outcome: Improving  VSS, pain controlled with po pain meds.  BM after suppository.  Passing gas.  Tolerating regular diet.  Adequate I&Os.  Incision CD&I with staples.  Abdominal binder on.  Up ambulating independently.  DC instructions reviewed and well received by patient.  DC to home when ride here.

## 2017-12-09 NOTE — PROGRESS NOTES
"Surgery    complaints this morning  Abdominal pain manageable with oral oxycodone and Tylenol  Walking in hallways independently  Voiding okay  Dalton function normalizing    Gen:  Awake, Alert, NAD  Blood pressure 139/84, pulse 91, temperature 98.4  F (36.9  C), temperature source Oral, resp. rate 16, height 1.753 m (5' 9\"), weight 102.1 kg (225 lb), SpO2 95 %.  Resp - clear to ascultation.    Cardiac - Regular rate & rhythm without murmur  Abdomen - soft, appropriately tender, rotund. Incisions healing appropriately.  Small blister to right of incision near the umbilicus.  Scant serosanguineous drainage from lower half of the incision.  Extremities - no lower extremity edema.    A/P a 52-year-old man with incarcerated ventral hernia, s/p emergent open ventral hernia repair on 12/7/2017.    - Doing well this morning.   - replace abdominal binder with more narrow size that fits his torso appropriately.  - instructed.   - discharge to home today.     Everett Manzano PA-C  Office: [phone]  Pager: [phone]        "

## 2017-12-11 NOTE — DISCHARGE SUMMARY
[facility] Discharge Summary    Christiano Thorne MRN# [id number]   Age: 52 year old YOB: 1965     Date of Admission:  12/6/2017  Date of Discharge:  12/9/2017 11:37 AM  Admitting Provider:  Whitney East MD  Discharge Provider:  Whitney East MD  Discharging Service: General Surgery     Primary Provider: [facility]  Primary Care Physician Phone Number: [phone]          Admission Diagnoses:   Principle Diagnosis: SBO (small bowel obstruction) [K56.609]  Incarcerated hernia [K46.0]  Secondary Diagnoses:          Discharge Diagnosis:   S/p exploratory laparotomy and repair of ventral hernia          Procedures:   Procedure(s): Exploratory laparotomy with repair of ventral hernia and lysis of adhesions            Discharge Medications:     Discharge Medication List as of 12/9/2017 10:34 AM      START taking these medications    Details   senna-docusate (SENOKOT-S;PERICOLACE) 8.6-50 MG per tablet Take 2 tablets by mouth 2 times daily as needed for constipation, Disp-30 tablet, R-0, E-Prescribe      oxyCODONE IR (ROXICODONE) 5 MG tablet Take 1-2 tablets (5-10 mg) by mouth every 3 hours as needed for moderate to severe pain, Disp-30 tablet, R-0, Local Print         CONTINUE these medications which have NOT CHANGED    Details   Sertraline HCl (ZOLOFT PO) Take 100 mg by mouth daily, Historical      Ascorbic Acid (VITAMIN C PO) Take 500 mg by mouth daily, Historical                 Allergies:         Allergies   Allergen Reactions     Amoxicillin Hives, Swelling and Rash     Occurred in 1988             Brief History of Illness:   Christiano Thorne presented with an incarcerated ventral hernia.     After discussing the risks, benefits, and possible complications, informed consent was obtained and the patient underwent the above procedure.  There were no complications.  Please see the Operative Report for full details.           Hospital Course:   Christiano Thorne's hospital course  "was unremarkable.  He recovered as anticipated and experienced no post-operative complications. He was tolerating a regular diet at the time of discharge.     On the date of discharge, the patient was discharged to home in stable condition and afebrile.  He verbalized understanding of all discharge instructions and felt comfortable with the discharge plan.  He was asked to call with any further questions or concerns.         Condition on Discharge:      Discharge condition: Stable   Discharge vitals: Blood pressure 139/84, pulse 91, temperature 98.4  F (36.9  C), temperature source Oral, resp. rate 16, height 1.753 m (5' 9\"), weight 102.1 kg (225 lb), SpO2 95 %.           Discharge Disposition:   Discharged to home          Discharge Instructions and Follow-Up:      Christiano Thorne was asked to follow up with surgical team in 1-2 weeks.      Whitney Perez MD  [facility]          "

## 2017-12-22 ENCOUNTER — OFFICE VISIT (OUTPATIENT)
Dept: SURGERY | Facility: CLINIC | Age: 52
End: 2017-12-22
Payer: COMMERCIAL

## 2017-12-22 DIAGNOSIS — Z09 SURGERY FOLLOW-UP EXAMINATION: Primary | ICD-10-CM

## 2017-12-22 PROCEDURE — 99024 POSTOP FOLLOW-UP VISIT: CPT | Performed by: SURGERY

## 2017-12-22 NOTE — PROGRESS NOTES
Surgery Postoperative Note    S: Christiano returns for follow-up after exploratory laparotomy with repair of ventral hernia for an incarcerated ventral hernia.  He reports he is overall doing very well.  He had fair amount of pain the first few days after discharge and had issues with constipation.  This is all since resolved.  He does have itching surrounding his incision.  He is using an abdominal binder mostly to prevent him from itching the incision.  Eating well.  He denies any nausea or vomiting.    Abdomen: Midline incision healing well, staples removed.  Some reactive erythema surrounding staples.  No evidence of infection      A/P  Christiano Thorne is recovering from a Exploratory Laparotomy and repair of ventral hernia. I advised him to slowly return to regular activity. I expect he will make a complete recovery without issues.  I instructed him no lifting more than 20 pounds for the next 4 weeks.  He is at increased risk for hernia recurrence given this was repaired primarily without mesh.  We discussed that the reason for not using mesh was my concern for potential infection from translocation from the intestine as it had been very incarcerated.    Thank you for the opportunity to help in his care.    Whitney Perez  [facility]  [phone]    Please route or send letter to:  Primary Care Provider (PCP) and Referring Provider

## 2017-12-22 NOTE — MR AVS SNAPSHOT
"              After Visit Summary   2017    Christiano Thorne    MRN: [id number]           Patient Information     Date Of Birth          1965        Visit Information        Provider Department      2017 3:00 PM Whitney Perez MD [facility] General Surgery      Today's Diagnoses     Surgery follow-up examination    -  1       Follow-ups after your visit        Who to contact     If you have questions or need follow up information about today's clinic visit or your schedule please contact [facility] directly at [phone].  Normal or non-critical lab and imaging results will be communicated to you by [employer], letter or phone within 4 business days after the clinic has received the results. If you do not hear from us within 7 days, please contact the clinic through [employer] or phone. If you have a critical or abnormal lab result, we will notify you by phone as soon as possible.  Submit refill requests through [employer] or call your pharmacy and they will forward the refill request to us. Please allow 3 business days for your refill to be completed.          Additional Information About Your Visit        MyChart Information     [employer] lets you send messages to your doctor, view your test results, renew your prescriptions, schedule appointments and more. To sign up, go to [email or web address] . Click on \"Log in\" on the left side of the screen, which will take you to the Welcome page. Then click on \"Sign up Now\" on the right side of the page.     You will be asked to enter the access code listed below, as well as some personal information. Please follow the directions to create your username and password.     Your access code is: [email or web address]  Expires: 3/9/2018 10:34 AM     Your access code will  in 90 days. If you need help or a new code, please call your [place] clinic or [phone].        Care EveryWhere ID     This is your " Care EveryWhere ID. This could be used by other organizations to access your [place] medical records  [id number]         Blood Pressure from Last 3 Encounters:   12/09/17 139/84   11/25/13 (!) 132/99    Weight from Last 3 Encounters:   12/06/17 225 lb (102.1 kg)   11/25/13 220 lb (99.8 kg)              Today, you had the following     No orders found for display       Primary Care Provider Office Phone # Fax #    [facility] [phone]       [address] [place] [id number]        Equal Access to Services     JACQUELYN [facility] : Hadii hang wallace Soharry, wamoe salgado, qadanielle kaalmathania martin, eric kevin . So [facility] [phone].    ATENCIÓN: Si habla español, tiene a chavez disposición servicios gratuitos de asistencia lingüística. [facility] [phone].    We comply with applicable federal civil rights laws and [place] laws. We do not discriminate on the basis of race, color, national origin, age, disability, sex, sexual orientation, or gender identity.            Thank you!     Thank you for choosing [facility]  for your care. Our goal is always to provide you with excellent care. Hearing back from our patients is one way we can continue to improve our services. Please take a few minutes to complete the written survey that you may receive in the mail after your visit with us. Thank you!             Your Updated Medication List - Protect others around you: Learn how to safely use, store and throw away your medicines at www.disposemymeds.org.          This list is accurate as of: 12/22/17  3:13 PM.  Always use your most recent med list.                   Brand Name Dispense Instructions for use Diagnosis    VITAMIN C PO      Take 500 mg by mouth daily        ZOLOFT PO      Take 100 mg by mouth daily

## (undated) DEVICE — ESU ELEC BLADE 2.75" COATED/INSULATED E1455

## (undated) DEVICE — SPONGE LAP 18X18" 23250-400

## (undated) DEVICE — SU PDS II 0 CTX 60" Z990G

## (undated) DEVICE — SU VICRYL 4-0 PS-2 18" UND J496H

## (undated) DEVICE — STPL SKIN 35W APPOSE 8886803712

## (undated) DEVICE — BLADE CLIPPER SGL USE 9680

## (undated) DEVICE — WIPES FOLEY CARE SURESTEP PROVON DFC100

## (undated) DEVICE — SU VICRYL 3-0 SH 27" J316H

## (undated) DEVICE — DRAPE LAP W/ARMBOARD 29410

## (undated) DEVICE — PACK MINOR SBA15MIFSE

## (undated) DEVICE — DRSG TELFA ISLAND 4X10"

## (undated) DEVICE — GLOVE PROTEXIS MICRO 6.0  2D73PM60

## (undated) DEVICE — LINEN TOWEL PACK X5 5464

## (undated) DEVICE — SU VICRYL 3-0 SH CR 8X18" J774

## (undated) DEVICE — DRSG GAUZE 4X4" 3033

## (undated) DEVICE — BNDG ABDOMINAL BINDER 9X62-84" 79-89210

## (undated) DEVICE — SUCTION TIP YANKAUER W/O VENT K86

## (undated) DEVICE — PREP CHLORAPREP 26ML TINTED ORANGE  260815

## (undated) DEVICE — NDL 22GA 1.5"

## (undated) DEVICE — DRSG KERLIX FLUFFS X5

## (undated) DEVICE — ESU GROUND PAD UNIVERSAL W/O CORD

## (undated) DEVICE — GLOVE PROTEXIS BLUE W/NEU-THERA 6.5  2D73EB65

## (undated) DEVICE — CATH TRAY FOLEY COUDE SURESTEP 16FR W/URNE MTR STLK A304716A

## (undated) RX ORDER — PROPOFOL 10 MG/ML
INJECTION, EMULSION INTRAVENOUS
Status: DISPENSED
Start: 2017-12-06

## (undated) RX ORDER — GLYCOPYRROLATE 0.2 MG/ML
INJECTION, SOLUTION INTRAMUSCULAR; INTRAVENOUS
Status: DISPENSED
Start: 2017-12-06

## (undated) RX ORDER — DEXAMETHASONE SODIUM PHOSPHATE 4 MG/ML
INJECTION, SOLUTION INTRA-ARTICULAR; INTRALESIONAL; INTRAMUSCULAR; INTRAVENOUS; SOFT TISSUE
Status: DISPENSED
Start: 2017-12-06

## (undated) RX ORDER — HYDROMORPHONE HYDROCHLORIDE 1 MG/ML
INJECTION, SOLUTION INTRAMUSCULAR; INTRAVENOUS; SUBCUTANEOUS
Status: DISPENSED
Start: 2017-12-07

## (undated) RX ORDER — BUPIVACAINE HYDROCHLORIDE 5 MG/ML
INJECTION, SOLUTION EPIDURAL; INTRACAUDAL
Status: DISPENSED
Start: 2017-12-06

## (undated) RX ORDER — ONDANSETRON 2 MG/ML
INJECTION INTRAMUSCULAR; INTRAVENOUS
Status: DISPENSED
Start: 2017-12-06

## (undated) RX ORDER — LIDOCAINE HYDROCHLORIDE 20 MG/ML
INJECTION, SOLUTION EPIDURAL; INFILTRATION; INTRACAUDAL; PERINEURAL
Status: DISPENSED
Start: 2017-12-06

## (undated) RX ORDER — FENTANYL CITRATE 50 UG/ML
INJECTION, SOLUTION INTRAMUSCULAR; INTRAVENOUS
Status: DISPENSED
Start: 2017-12-06

## (undated) RX ORDER — CLINDAMYCIN PHOSPHATE 900 MG/50ML
INJECTION, SOLUTION INTRAVENOUS
Status: DISPENSED
Start: 2017-12-06